# Patient Record
Sex: FEMALE | Race: BLACK OR AFRICAN AMERICAN | Employment: OTHER | URBAN - METROPOLITAN AREA
[De-identification: names, ages, dates, MRNs, and addresses within clinical notes are randomized per-mention and may not be internally consistent; named-entity substitution may affect disease eponyms.]

---

## 2017-11-06 ENCOUNTER — HOSPITAL ENCOUNTER (OUTPATIENT)
Dept: MAMMOGRAPHY | Age: 67
Discharge: HOME | End: 2017-11-06
Attending: FAMILY MEDICINE
Payer: MEDICARE

## 2017-11-06 DIAGNOSIS — Z12.31 VISIT FOR SCREENING MAMMOGRAM: ICD-10-CM

## 2017-11-06 PROCEDURE — 77067 SCR MAMMO BI INCL CAD: CPT

## 2018-04-04 RX ORDER — ASPIRIN 81 MG/1
81 TABLET ORAL DAILY
COMMUNITY

## 2018-04-04 RX ORDER — ATORVASTATIN CALCIUM 20 MG/1
20 TABLET, FILM COATED ORAL
COMMUNITY

## 2018-04-04 RX ORDER — AMLODIPINE BESYLATE 5 MG/1
5 TABLET ORAL DAILY
COMMUNITY

## 2018-04-04 RX ORDER — LOSARTAN POTASSIUM 50 MG/1
50 TABLET ORAL DAILY
COMMUNITY
End: 2021-08-30

## 2018-04-04 NOTE — PERIOP NOTES
[facility]  Ambulatory Surgery Unit  Pre-operative Instructions    Surgery/Procedure Date  4/11            Tentative Arrival Time 0915      1. On the day of your surgery/procedure, please report to the Ambulatory Surgery Unit Registration Desk and sign in at your designated time. The Ambulatory Surgery Unit is located in [facility] on the [facility] side of the [facility] across from the [address] [place] [address]. Please have all of your health insurance cards and a photo ID. 2. You must have someone with you to drive you home, as you should not drive a car for 24 hours following anesthesia. Please make arrangements for a responsible adult friend or family member to stay with you for at least the first 24 hours after your surgery. 3. Do not have anything to eat or drink (including water, gum, mints, coffee, juice) after midnight 4/10. This may not apply to medications prescribed by your physician. (Please note below the special instructions with medications to take the morning of surgery, if applicable.)    4. We recommend you do not drink any alcoholic beverages for 24 hours before and after your surgery. 5. Contact your surgeons office for instructions on the following medications: non-steroidal anti-inflammatory drugs (i.e. Advil, Aleve), vitamins, and supplements. (Some surgeons will want you to stop these medications prior to surgery and others may allow you to take them)   **If you are currently taking Plavix, Coumadin, Aspirin and/or other blood-thinning agents, contact your surgeon for instructions. ** Your surgeon will partner with the physician prescribing these medications to determine if it is safe to stop or if you need to continue taking. Please do not stop taking these medications without instructions from your surgeon.     6. In an effort to help prevent surgical site infection, we ask that you shower with an anti-bacterial soap (i.e. Dial or Safeguard) for 3 days prior to and on the morning of surgery, using a fresh towel after each shower. (Please begin this process with fresh bed linens.) Do not apply any lotions, powders, or deodorants after the shower on the day of your procedure. If applicable, please do not shave the operative site for 48 hours prior to surgery. 7. Wear comfortable clothes. Wear glasses instead of contacts. Do not bring any jewelry or money (other than copays or fees as instructed). Do not wear make-up, particularly mascara, the morning of your surgery. Do not wear nail polish, particularly if you are having foot /hand surgery. Wear your hair loose or down, no ponytails, buns, kya pins or clips. All body piercings must be removed. 8. You should understand that if you do not follow these instructions your surgery may be cancelled. If your physical condition changes (i.e. fever, cold or flu) please contact your surgeon as soon as possible. 9. It is important that you be on time. If a situation occurs where you may be late, or if you have any questions or problems, please call [phone].    10. Your surgery time may be subject to change. You will receive a phone call the day prior to surgery to confirm your arrival time. Special Instructions: Take all medications and inhalers, as prescribed, on the morning of surgery with a sip of water EXCEPT: none      I understand a pre-operative phone call will be made to verify my surgery time. In the event that I am not available, I give permission for a message to be left on my answering service and/or with another person?       yes      Reviewed by phone with pt, verbalized understanding.   ___________________      ___________________      ________________  (Signature of Patient)          (Witness)                   (Date and Time)

## 2018-04-06 NOTE — H&P
Visit Type:  pre op  Primary Provider:  Larry Muir MD    CC:  Abnormal Pap. History of Present Illness:  76 YO patient presents for pre op visit for LEEP procedure scheduled for 2018    DORCAS pap  EMB benign endometrium, fragment of THOMAS 2-3  colposcopy bx benign, ecc THOMAS 2-3      Vital Signs   75Years Old Female  Height:  60.5 inches  Weight: 117.5 pounds  BMI:      22.65  BP:       124/80    Past Pregnancy History   : 1  Para:     1  Aborta:  0  Term: 1, Premature: 0, Living Children: 1, Vaginal Deliveries: 1, C-Sections: 0, Elect. Ab: 0, Ectopics: 0    Gynecologic History   Does patient have any problems with urine leakage? no  Does patient have any other bladder problems? no  History of abnormal pap: yes  Gardasil Injection History: Not Applicable  Pt currently sexually active: yes  Current Contraception: None. History of STD: no     Allergies    This patient has no known allergies.     Medications Removed from Medication List          Past Medical History:     Reviewed history from 2018 and no changes required:        Abnormal pap smear , No colposcopy, follow up paps normal        Hypertension        Hyperlipidemia     Past Surgical History:     Reviewed history from 2018 and no changes required:        Vaginal delivery x1        Toe surgeries        Breast Reduction     Family History Summary:      Reviewed history Last on 2018 and no changes required:2018  Mother Sudha Labor.) - Has Family History of Diabetes - Entered On: 3/5/2018  Mother Sudha Labor.) - Has Family History of Hypertension - Entered On: 3/5/2018  Mother Madras Labor.) - Has Family History of Heart Disease - Entered On: 3/5/2018    General Comments - FH:  Family history transferred to MU2 compliant        Social History:     Reviewed history from 2018 and no changes required:        Single-        Retired-[employer]        Past Pregnancy History      :  1     Term Births:  1     Premature Births: 0     Living Children: 1     Para:   1     Prev : 0     Aborta:  0     Elect. Ab:  0     Spont. Ab:  0     Ectopics:  0      Review of Systems        See HPI    Except as noted in the HPI, the review of systems is negative for General, Breast, , Resp, GI, Endo, MS, Psych and Heme.       General Medical Physical Exam:     General Appearance:       well developed, well nourished, in no acute distress    Genitourinary:        Ext. 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) (ISE06-N30.1)  Discussed recommendation to proceed with LEEP procedure  Discussed risks including bleeding, infection, damage to surrounding structures, cervical stenosis   Questions were answered and consent signed  Post op expectations reviewed  Declines percocet rx, plan ibuprofen    Orders:  Pre-Op Exam (CPT-PO)      Medications (at conclusion of this visit)    2018 ADULT ASPIRIN EC LOW STRENGTH 81 MG ORAL TABLET DELAYED RELEASE (ASPIRIN)   2018 ATORVASTATIN CALCIUM 20 MG ORAL TABLET (ATORVASTATIN CALCIUM)   2018 AMLODIPINE BESYLATE 5 MG ORAL TABLET (AMLODIPINE BESYLATE) Prescribed by non 606/706 Analy Arevalo MD  2018 LOSARTAN POTASSIUM 50 MG ORAL TABLET (LOSARTAN POTASSIUM) Prescribed by non 606/706 Analy Arevalo MD          Electronically signed by Jefferson Bruner MD on 2018 at 1:01 PM    ________________________________________________________________________

## 2018-04-10 ENCOUNTER — ANESTHESIA EVENT (OUTPATIENT)
Dept: SURGERY | Age: 68
End: 2018-04-10
Payer: MEDICARE

## 2018-04-11 ENCOUNTER — ANESTHESIA (OUTPATIENT)
Dept: SURGERY | Age: 68
End: 2018-04-11
Payer: MEDICARE

## 2018-04-11 ENCOUNTER — HOSPITAL ENCOUNTER (OUTPATIENT)
Age: 68
Setting detail: OUTPATIENT SURGERY
Discharge: HOME | End: 2018-04-11
Attending: OBSTETRICS & GYNECOLOGY | Admitting: OBSTETRICS & GYNECOLOGY
Payer: MEDICARE

## 2018-04-11 VITALS
TEMPERATURE: 97.6 F | OXYGEN SATURATION: 100 % | DIASTOLIC BLOOD PRESSURE: 72 MMHG | HEIGHT: 60 IN | WEIGHT: 114.5 LBS | RESPIRATION RATE: 20 BRPM | HEART RATE: 52 BPM | SYSTOLIC BLOOD PRESSURE: 106 MMHG | BODY MASS INDEX: 22.48 KG/M2

## 2018-04-11 PROCEDURE — 88307 TISSUE EXAM BY PATHOLOGIST: CPT | Performed by: OBSTETRICS & GYNECOLOGY

## 2018-04-11 PROCEDURE — 77030007304 HC ELECTRD LP RND MEGA -A: Performed by: OBSTETRICS & GYNECOLOGY

## 2018-04-11 PROCEDURE — 88305 TISSUE EXAM BY PATHOLOGIST: CPT | Performed by: OBSTETRICS & GYNECOLOGY

## 2018-04-11 PROCEDURE — 76210000050 HC AMBSU PH II REC 0.5 TO 1 HR: Performed by: OBSTETRICS & GYNECOLOGY

## 2018-04-11 PROCEDURE — 74011250636 HC RX REV CODE- 250/636

## 2018-04-11 PROCEDURE — 77030010432 HC ELECTRD BALL COVD -B: Performed by: OBSTETRICS & GYNECOLOGY

## 2018-04-11 PROCEDURE — 76030000000 HC AMB SURG OR TIME 0.5 TO 1: Performed by: OBSTETRICS & GYNECOLOGY

## 2018-04-11 PROCEDURE — 77030021352 HC CBL LD SYS DISP COVD -B: Performed by: OBSTETRICS & GYNECOLOGY

## 2018-04-11 PROCEDURE — 77030003666 HC NDL SPINAL BD -A: Performed by: OBSTETRICS & GYNECOLOGY

## 2018-04-11 PROCEDURE — 74011250636 HC RX REV CODE- 250/636: Performed by: ANESTHESIOLOGY

## 2018-04-11 PROCEDURE — 76060000061 HC AMB SURG ANES 0.5 TO 1 HR: Performed by: OBSTETRICS & GYNECOLOGY

## 2018-04-11 PROCEDURE — 77030020255 HC SOL INJ LR 1000ML BG: Performed by: OBSTETRICS & GYNECOLOGY

## 2018-04-11 PROCEDURE — 74011000250 HC RX REV CODE- 250

## 2018-04-11 PROCEDURE — 77030011640 HC PAD GRND REM COVD -A: Performed by: OBSTETRICS & GYNECOLOGY

## 2018-04-11 PROCEDURE — 74011000250 HC RX REV CODE- 250: Performed by: OBSTETRICS & GYNECOLOGY

## 2018-04-11 PROCEDURE — 76210000040 HC AMBSU PH I REC FIRST 0.5 HR: Performed by: OBSTETRICS & GYNECOLOGY

## 2018-04-11 RX ORDER — ONDANSETRON 2 MG/ML
4 INJECTION INTRAMUSCULAR; INTRAVENOUS AS NEEDED
Status: DISCONTINUED | OUTPATIENT
Start: 2018-04-11 | End: 2018-04-11 | Stop reason: HOSPADM

## 2018-04-11 RX ORDER — FENTANYL CITRATE 50 UG/ML
INJECTION, SOLUTION INTRAMUSCULAR; INTRAVENOUS AS NEEDED
Status: DISCONTINUED | OUTPATIENT
Start: 2018-04-11 | End: 2018-04-11 | Stop reason: HOSPADM

## 2018-04-11 RX ORDER — SODIUM CHLORIDE 0.9 % (FLUSH) 0.9 %
5-10 SYRINGE (ML) INJECTION AS NEEDED
Status: DISCONTINUED | OUTPATIENT
Start: 2018-04-11 | End: 2018-04-11 | Stop reason: HOSPADM

## 2018-04-11 RX ORDER — MIDAZOLAM HYDROCHLORIDE 1 MG/ML
INJECTION, SOLUTION INTRAMUSCULAR; INTRAVENOUS AS NEEDED
Status: DISCONTINUED | OUTPATIENT
Start: 2018-04-11 | End: 2018-04-11 | Stop reason: HOSPADM

## 2018-04-11 RX ORDER — OXYCODONE AND ACETAMINOPHEN 5; 325 MG/1; MG/1
1 TABLET ORAL
Status: DISCONTINUED | OUTPATIENT
Start: 2018-04-11 | End: 2018-04-11 | Stop reason: HOSPADM

## 2018-04-11 RX ORDER — FERRIC SUBSULFATE 20-22G/100
SOLUTION, NON-ORAL MISCELLANEOUS AS NEEDED
Status: DISCONTINUED | OUTPATIENT
Start: 2018-04-11 | End: 2018-04-11 | Stop reason: HOSPADM

## 2018-04-11 RX ORDER — SODIUM CHLORIDE, SODIUM LACTATE, POTASSIUM CHLORIDE, CALCIUM CHLORIDE 600; 310; 30; 20 MG/100ML; MG/100ML; MG/100ML; MG/100ML
25 INJECTION, SOLUTION INTRAVENOUS CONTINUOUS
Status: DISCONTINUED | OUTPATIENT
Start: 2018-04-11 | End: 2018-04-11 | Stop reason: HOSPADM

## 2018-04-11 RX ORDER — DIPHENHYDRAMINE HYDROCHLORIDE 50 MG/ML
12.5 INJECTION, SOLUTION INTRAMUSCULAR; INTRAVENOUS AS NEEDED
Status: DISCONTINUED | OUTPATIENT
Start: 2018-04-11 | End: 2018-04-11 | Stop reason: HOSPADM

## 2018-04-11 RX ORDER — LIDOCAINE HYDROCHLORIDE 20 MG/ML
INJECTION, SOLUTION EPIDURAL; INFILTRATION; INTRACAUDAL; PERINEURAL AS NEEDED
Status: DISCONTINUED | OUTPATIENT
Start: 2018-04-11 | End: 2018-04-11 | Stop reason: HOSPADM

## 2018-04-11 RX ORDER — MORPHINE SULFATE 10 MG/ML
2 INJECTION, SOLUTION INTRAMUSCULAR; INTRAVENOUS
Status: DISCONTINUED | OUTPATIENT
Start: 2018-04-11 | End: 2018-04-11 | Stop reason: HOSPADM

## 2018-04-11 RX ORDER — LIDOCAINE HYDROCHLORIDE AND EPINEPHRINE 10; 10 MG/ML; UG/ML
INJECTION, SOLUTION INFILTRATION; PERINEURAL AS NEEDED
Status: DISCONTINUED | OUTPATIENT
Start: 2018-04-11 | End: 2018-04-11 | Stop reason: HOSPADM

## 2018-04-11 RX ORDER — PROPOFOL 10 MG/ML
INJECTION, EMULSION INTRAVENOUS
Status: DISCONTINUED | OUTPATIENT
Start: 2018-04-11 | End: 2018-04-11 | Stop reason: HOSPADM

## 2018-04-11 RX ORDER — KETOROLAC TROMETHAMINE 30 MG/ML
INJECTION, SOLUTION INTRAMUSCULAR; INTRAVENOUS AS NEEDED
Status: DISCONTINUED | OUTPATIENT
Start: 2018-04-11 | End: 2018-04-11 | Stop reason: HOSPADM

## 2018-04-11 RX ORDER — LIDOCAINE HYDROCHLORIDE 10 MG/ML
0.1 INJECTION, SOLUTION EPIDURAL; INFILTRATION; INTRACAUDAL; PERINEURAL AS NEEDED
Status: DISCONTINUED | OUTPATIENT
Start: 2018-04-11 | End: 2018-04-11 | Stop reason: HOSPADM

## 2018-04-11 RX ORDER — ONDANSETRON 2 MG/ML
INJECTION INTRAMUSCULAR; INTRAVENOUS AS NEEDED
Status: DISCONTINUED | OUTPATIENT
Start: 2018-04-11 | End: 2018-04-11 | Stop reason: HOSPADM

## 2018-04-11 RX ORDER — SODIUM CHLORIDE 0.9 % (FLUSH) 0.9 %
5-10 SYRINGE (ML) INJECTION EVERY 8 HOURS
Status: DISCONTINUED | OUTPATIENT
Start: 2018-04-11 | End: 2018-04-11 | Stop reason: HOSPADM

## 2018-04-11 RX ORDER — HYDROMORPHONE HYDROCHLORIDE 1 MG/ML
.2-.5 INJECTION, SOLUTION INTRAMUSCULAR; INTRAVENOUS; SUBCUTANEOUS ONCE
Status: DISCONTINUED | OUTPATIENT
Start: 2018-04-11 | End: 2018-04-11 | Stop reason: HOSPADM

## 2018-04-11 RX ORDER — FENTANYL CITRATE 50 UG/ML
25 INJECTION, SOLUTION INTRAMUSCULAR; INTRAVENOUS
Status: DISCONTINUED | OUTPATIENT
Start: 2018-04-11 | End: 2018-04-11 | Stop reason: HOSPADM

## 2018-04-11 RX ADMIN — LIDOCAINE HYDROCHLORIDE 40 MG: 20 INJECTION, SOLUTION EPIDURAL; INFILTRATION; INTRACAUDAL; PERINEURAL at 10:37

## 2018-04-11 RX ADMIN — FENTANYL CITRATE 50 MCG: 50 INJECTION, SOLUTION INTRAMUSCULAR; INTRAVENOUS at 10:37

## 2018-04-11 RX ADMIN — KETOROLAC TROMETHAMINE 30 MG: 30 INJECTION, SOLUTION INTRAMUSCULAR; INTRAVENOUS at 11:15

## 2018-04-11 RX ADMIN — ONDANSETRON 4 MG: 2 INJECTION INTRAMUSCULAR; INTRAVENOUS at 10:59

## 2018-04-11 RX ADMIN — FENTANYL CITRATE 50 MCG: 50 INJECTION, SOLUTION INTRAMUSCULAR; INTRAVENOUS at 10:43

## 2018-04-11 RX ADMIN — PROPOFOL 140 MCG/KG/MIN: 10 INJECTION, EMULSION INTRAVENOUS at 10:37

## 2018-04-11 RX ADMIN — SODIUM CHLORIDE, SODIUM LACTATE, POTASSIUM CHLORIDE, AND CALCIUM CHLORIDE 25 ML/HR: 600; 310; 30; 20 INJECTION, SOLUTION INTRAVENOUS at 10:05

## 2018-04-11 RX ADMIN — MIDAZOLAM HYDROCHLORIDE 2 MG: 1 INJECTION, SOLUTION INTRAMUSCULAR; INTRAVENOUS at 10:30

## 2018-04-11 NOTE — ANESTHESIA PREPROCEDURE EVALUATION
Anesthetic History   No history of anesthetic complications            Review of Systems / Medical History  Patient summary reviewed, nursing notes reviewed and pertinent labs reviewed    Pulmonary  Within defined limits                 Neuro/Psych   Within defined limits           Cardiovascular    Hypertension: well controlled          Hyperlipidemia    Exercise tolerance: >4 METS     GI/Hepatic/Renal  Within defined limits              Endo/Other  Within defined limits           Other Findings            Physical Exam    Airway  Mallampati: III  TM Distance: 4 - 6 cm  Neck ROM: normal range of motion   Mouth opening: Normal     Cardiovascular    Rhythm: regular  Rate: normal      Pertinent negatives: No murmur   Dental    Dentition: Caps/crowns and Bridges     Pulmonary  Breath sounds clear to auscultation               Abdominal  GI exam deferred       Other Findings            Anesthetic Plan    ASA: 2  Anesthesia type: MAC and general - backup          Induction: Intravenous  Anesthetic plan and risks discussed with: Patient

## 2018-04-11 NOTE — PERIOP NOTES
1115 Pt arrived to PACU. Pt alert and oriented. Pt c/o mild cramping. CRNA giving toradol. No drainage on peripad. Abd soft. 1150 Discharge instructions reviewed with friend Gilda Hu. Pt states cramping is much better. Pt meets discharge criteria.

## 2018-04-11 NOTE — DISCHARGE INSTRUCTIONS
After Care Instructions For Leep Cone Biopsy      1. Typically following this procedure, there is minimal pain. However, you may experience some dull crampy lower abdominal discomfort which can be relieved by Tylenol or Motrin. If severe pain develops, notify the office at [phone].    2. It is normal to experience some spotting or even light bleeding. This discharge may occur for several days following the procedure. If bleeding exceeds soaking a pad every 2 hours or passing blood clots, you should contact the office. 3. Avoid placing anything in your vagina. Do not douche or use tampons. [place] may be uncomfortable and may create bleeding and/or infection. These restrictions will be lifted after your physician has seen you on your post-op visit. 4. You may take showers. Avoid using a tub bath, swimming pool or hot tub until after your check-up. 5. Please notify the office if you have a fever which is a temperature above 100.4. You should also notify the office if you develop severe abdominal pain, heavy vaginal bleeding or a foul smelling vaginal discharge. 6. It is difficult to predict when your next menstrual period will occur as your body has undergone a major stress. Your period should regulate itself within the first 6 weeks post-op. 7. Please do not do strenuous exercise for the first 2 weeks following the procedure. You can then resume all usual activity. Your follow-up appointment should be in 4-6 weeks. Please contact the office at    [phone] if an appointment has not already been set up. Your first Pap smear post-operatively will be in 3-6 months. Please have a thorough discussion with the doctor about how often your follow-up needs to be, depending upon the disease we have treated you for.    >>>You received Toradol during your surgery.  You may not take any form of NSAIDS (non steroidal anti inflammatory drugs) such as Advil, Ibuprofen, Aleve, Motrin until 5:30.    DO NOT DRIVE WHILE TAKING NARCOTIC PAIN MEDICATIONS. DO NOT TAKE SLEEPING MEDICATIONS OR ANTIANXIETY MEDICATIONS WHILE TAKING NARCOTIC PAIN MEDICATIONS,  ESPECIALLY THE NIGHT OF ANESTHESIA. CPAP PATIENTS BE SURE TO WEAR MACHINE WHENEVER NAPPING OR SLEEPING. DISCHARGE SUMMARY from Nurse    The following personal items collected during your admission are returned to you:   Dental Appliance: Dental Appliances: None  Vision: Visual Aid: Glasses  Hearing Aid:    Jewelry:    Clothing: Clothing:  (clothing under stretcher)  Other Valuables:    Valuables sent to safe:        PATIENT INSTRUCTIONS:    After General Anesthesia or Intravenous Sedation, for 24 hours or while taking prescription Narcotics:        Someone should be with you for the next 24 hours. For your own safety, a responsible adult must drive you home. · Limit your activities  · Recommended activity: Rest today, up with assistance today. Do not climb stairs or shower unattended for the next 24 hours. · Please start with a soft bland diet and advance as tolerated (no nausea) to regular diet. · If you have a sore throat you should try the following: fluids, warm salt water gargles, or throat lozenges. If it does not improve after several days please follow up with your primary physician. · Do not drive and operate hazardous machinery  · Do not make important personal or business decisions  · Do  not drink alcoholic beverages  · If you have not urinated within 8 hours after discharge, please contact your surgeon on call.     Report the following to your surgeon:  · Excessive pain, swelling, redness or odor of or around the surgical area  · Temperature over 100.5  · Nausea and vomiting lasting longer than 4 hours or if unable to take medications  · Any signs of decreased circulation or nerve impairment to extremity: change in color, persistent  numbness, tingling, coldness or increase pain      · You will receive a Post Operative Call from one of the Recovery Room Nurses on the day after your surgery to check on you. It is very important for us to know how you are recovering after your surgery. If you have an issue or need to speak with someone, please call your surgeon, do not wait for the post operative call. · You may receive an e-mail or letter in the mail from [facility] regarding your experience with us in the Ambulatory Surgery Unit. Your feedback is valuable to us and we appreciate your participation in the survey. · If the above instructions are not adequate, please contact Denis Chamorro RN, [facility] anesthesia Nurse Manager or our Anesthesiologist, at [phone]. If you are having problems after your surgery, call the physician at their office number. · We wish you a speedy recovery ? What to do at Home:      *  Please give a list of your current medications to your Primary Care Provider. *  Please update this list whenever your medications are discontinued, doses are      changed, or new medications (including over-the-counter products) are added. *  Please carry medication information at all times in case of emergency situations. These are general instructions for a healthy lifestyle:    No smoking/ No tobacco products/ Avoid exposure to second hand smoke    Surgeon General's Warning:  Quitting smoking now greatly reduces serious risk to your health. Obesity, smoking, and sedentary lifestyle greatly increases your risk for illness    A healthy diet, regular physical exercise & weight monitoring are important for maintaining a healthy lifestyle    You may be retaining fluid if you have a history of heart failure or if you experience any of the following symptoms:  Weight gain of 3 pounds or more overnight or 5 pounds in a week, increased swelling in our hands or feet or shortness of breath while lying flat in bed.   Please call your doctor as soon as you notice any of these symptoms; do not wait until your next office visit. Recognize signs and symptoms of STROKE:    B - Balance  E - Eyes    F-  Face looks uneven    A-  Arms unable to move or move even    S-  Speech slurred or non-existent    T-  Time-call 911 as soon as signs and symptoms begin-DO NOT go       Back to bed or wait to see if you get better-TIME IS BRAIN. If you have not received your influenza and/or pneumococcal vaccine, please follow up with your primary care physician. The discharge information has been reviewed with the patient and caregiver. The patient and caregiver verbalized understanding.

## 2018-04-11 NOTE — IP AVS SNAPSHOT
[address] 
[phone] Patient: Mercy Ramirez MRN: [id number] :1950 About your hospitalization You were admitted on:  2018 You last received care in the:  \[facility]\"" ASU PACU You were discharged on:  2018 Why you were hospitalized Your primary diagnosis was:  Not on File Follow-up Information Follow up With Details Comments Contact Info Oliverio Juárez MD   [facility] [address] [place] [address] 
[phone] Discharge Orders None A check vandana indicates which time of day the medication should be taken. My Medications CONTINUE taking these medications Instructions Each Dose to Equal  
 Morning Noon Evening Bedtime  
 amLODIPine 5 mg tablet Commonly known as:  Kraen Zepeda Your last dose was: Your next dose is: Take 5 mg by mouth daily. Indications: hypertension 5 mg  
    
   
   
   
  
 aspirin delayed-release 81 mg tablet Your last dose was: Your next dose is: Take 81 mg by mouth daily. 81 mg  
    
   
   
   
  
 atorvastatin 20 mg tablet Commonly known as:  LIPITOR Your last dose was: Your next dose is: Take 20 mg by mouth nightly. 20 mg  
    
   
   
   
  
 losartan 50 mg tablet Commonly known as:  COZAAR Your last dose was: Your next dose is: Take 50 mg by mouth daily. Indications: hypertension 50 mg Discharge Instructions After Care Instructions For Leep Cone Biopsy 1. Typically following this procedure, there is minimal pain. However, you may experience some dull crampy lower abdominal discomfort which can be relieved by Tylenol or Motrin. If severe pain develops, notify the office at [phone]. 2. It is normal to experience some spotting or even light bleeding. This discharge may occur for several days following the procedure. If bleeding exceeds soaking a pad every 2 hours or passing blood clots, you should contact the office. 3. Avoid placing anything in your vagina. Do not douche or use tampons. Millbra[place] may be uncomfortable and may create bleeding and/or infection. These restrictions will be lifted after your physician has seen you on your post-op visit. 4. You may take showers. Avoid using a tub bath, swimming pool or hot tub until after your check-up. 5. Please notify the office if you have a fever which is a temperature above 100.4. You should also notify the office if you develop severe abdominal pain, heavy vaginal bleeding or a foul smelling vaginal discharge. 6. It is difficult to predict when your next menstrual period will occur as your body has undergone a major stress. Your period should regulate itself within the first 6 weeks post-op. 7. Please do not do strenuous exercise for the first 2 weeks following the procedure. You can then resume all usual activity. Your follow-up appointment should be in 4-6 weeks. Please contact the office at   
[phone] if an appointment has not already been set up. Your first Pap smear post-operatively will be in 3-6 months. Please have a thorough discussion with the doctor about how often your follow-up needs to be, depending upon the disease we have treated you for. DO NOT DRIVE WHILE TAKING NARCOTIC PAIN MEDICATIONS. DO NOT TAKE SLEEPING MEDICATIONS OR ANTIANXIETY MEDICATIONS WHILE TAKING NARCOTIC PAIN MEDICATIONS,  ESPECIALLY THE NIGHT OF ANESTHESIA. CPAP PATIENTS BE SURE TO WEAR MACHINE WHENEVER NAPPING OR SLEEPING. DISCHARGE SUMMARY from Nurse The following personal items collected during your admission are returned to you:  
Dental Appliance: Dental Appliances: None Vision: Visual Aid: Glasses Hearing Aid:   
Jewelry:   
Clothing: Clothing:  (clothing under stretcher) Other Valuables:   
Valuables sent to safe:   
 
 
PATIENT INSTRUCTIONS: 
 
 
B - Balance E - Eyes F-  Face looks uneven A-  Arms unable to move or move even S-  Speech slurred or non-existent T-  Time-call 911 as soon as signs and symptoms begin-DO NOT go Back to bed or wait to see if you get better-TIME IS BRAIN.  
 
 
If you have not received your influenza and/or pneumococcal vaccine, please follow up with your primary care physician. The discharge information has been reviewed with the patient and caregiver. The patient and caregiver verbalized understanding. Introducing [facility]! [facility] introduces Stackify patient portal. Now you can access parts of your medical record, email your doctor's office, and request medication refills online. 1. In your internet browser, go to [email or web address] 2. Click on the First Time User? Click Here link in the Sign In box. You will see the New Member Sign Up page. 3. Enter your Stackify Access Code exactly as it appears below. You will not need to use this code after youve completed the sign-up process. If you do not sign up before the expiration date, you must request a new code. · Stackify Access Code: [email or web address] Expires: 6/20/2018  6:47 AM 
 
4. Enter the last four digits of your Social Security Number (xxxx) and Date of Birth (mm/dd/yyyy) as indicated and click Submit. You will be taken to the next sign-up page. 5. Create a Stackify ID. This will be your Stackify login ID and cannot be changed, so think of one that is secure and easy to remember. 6. Create a Stackify password. You can change your password at any time. 7. Enter your Password Reset Question and Answer. This can be used at a later time if you forget your password. 8. Enter your e-mail address. You will receive e-mail notification when new information is available in [address] 9. Click Sign Up. You can now view and download portions of your medical record. 10. Click the Download Summary menu link to download a portable copy of your medical information. If you have questions, please visit the Frequently Asked Questions section of the Stackify website. Remember, Stackify is NOT to be used for urgent needs. For medical emergencies, dial 911. Now available from your iPhone and Android! Introducing Kai Willard As a [facility] patient, I wanted to make you aware of our electronic visit tool called Kai Willard. [facility]ev Slorjmb 24/7 allows you to connect within minutes with a medical provider 24 hours a day, seven days a week via a mobile device or tablet or logging into a secure website from your computer. You can access Kai Willard from anywhere in the [place]. A virtual visit might be right for you when you have a simple condition and feel like you just dont want to get out of bed, or cant get away from work for an appointment, when your regular [facility] provider is not available (evenings, weekends or holidays), or when youre out of town and need minor care. Electronic visits cost only $49 and if the [facility]ev MashMe[facility] 24/7 provider determines a prescription is needed to treat your condition, one can be electronically transmitted to a nearby pharmacy*. Please take a moment to enroll today if you have not already done so. The enrollment process is free and takes just a few minutes. To enroll, please download the CloudAptitude 24/ActionFlow jose d to your tablet or phone, or visit [email or web address] to enroll on your computer. And, as an [address] [place] [address] patient with a [employer] account, the results of your visits will be scanned into your electronic medical record and your primary care provider will be able to view the scanned results. We urge you to continue to see your regular [facility] provider for your ongoing medical care. And while your primary care provider may not be the one available when you seek a Kai Willard virtual visit, the peace of mind you get from getting a real diagnosis real time can be priceless. For more information on Kai Willard, view our Frequently Asked Questions (FAQs) at [email or web address]. Sincerely, 
 
Dianelys Roberson MD 
Chief Medical Officer Jaye Barlow *:  certain medications cannot be prescribed via Kai Willard Providers Seen During Your Hospitalization Provider Specialty Primary office phone Roxie Roca MD Obstetrics & Gynecology [phone] Your Primary Care Physician (PCP) Primary Care Physician Office Phone Office Fax [address]  , [address] [place] [address] [phone] You are allergic to the following No active allergies Recent Documentation Height Weight BMI OB Status Smoking Status 1.524 m 51.9 kg 22.36 kg/m2 Postmenopausal Never Smoker Emergency Contacts Name Discharge Info Relation Home Work Mobile Sae Young DISCHARGE CAREGIVER [3] Other Relative [6] [phone] [place] CAREGIVER [3] Friend [5]   [phone] Patient Belongings The following personal items are in your possession at time of discharge: 
  Dental Appliances: None  Visual Aid: Glasses             Clothing:  (clothing under stretcher) Please provide this summary of care documentation to your next provider. Signatures-by signing, you are acknowledging that this After Visit Summary has been reviewed with you and you have received a copy. Patient Signature:  ____________________________________________________________ Date:  ____________________________________________________________  
  
Montserrat Corral Provider Signature:  ____________________________________________________________ Date:  ____________________________________________________________

## 2018-04-11 NOTE — PERIOP NOTES
Permission received to review discharge instructions and discuss private health information with friend Lavell Mccarthy.

## 2018-04-11 NOTE — OP NOTES
OPERATIVE NOTE     NAME:Martha Kim  :1950      DATE OF PROCEDURE:  2018     PREOPERATIVE DIAGNOSIS:  THOMAS 2-3     POSTOPERATIVE DIAGNOSIS:  same     PROCEDURE: Procedure(s):  LOOP ELECTRODE EXCISION PROCEDURE OF CERVIX LEEP      SURGEON:  Mariano Dennis MD     ASSISTANT: None     ANESTHESIA:monitored anesthesia care     EBL: Minimal     SPECIMENS: 1) ectocervical LEEP biopsy  2) endocervical LEEP biopsy  3) endocervical curettage     FINDINGS: Cervix normal appearing other than an area of brown discoloration from 11 oclock to 1 oclock. Cervix atrophic appearing, nearly flush to vagina. Non-staining area of transformation zone excised completely     DESCRIPTION OF PROCEDURE: Patient was placed on the operating table in the supine position and after induction of anesthesia she was placed in the dorsal lithotomy position and draped in the usual fashion for vaginal surgery. Cervix was exposed with an insulated Graves speculum. Lugols solution was placed on the cervix and a non-staining area across the transformation zone was noted. Approximately 10cc of 1% lidocaine with epinephrine was then injected into the cervix. The smoke evacuator was attached to the insulated speculum and turned on. A 15x12 LEEP loop was chosen and attached to the hand piece. Several trial passes were made to  the angle and depth of excision. The transformation zone was then excised in a single pass. A 10x10 loop was then attached and a \"top hat\" biopsy was then performed. An ECC was then performed. A roller ball was then placed in the hand piece and the LEEP bed was made hemostatic with cautery. Monsel's solution was then placed to assure hemostasis. Excellent hemostasis was noted at this time. All instruments were then removed from the vagina. Instrument counts were correct.  She tolerated the procedure well and was transferred to the PACU in stable condition.

## 2018-04-11 NOTE — PERIOP NOTES
Hira Yates  1950  [id number]    Situation:  Verbal report given from: Chintan Clark CRNA  Procedure: Procedure(s):  LOOP ELECTRODE EXCISION PROCEDURE OF CERVIX LEEP    Background:    Preoperative diagnosis: THOMAS 3    Postoperative diagnosis: THOMAS 3    :  Dr. Louise Carroll    Assistant(s): Circ-1: Rox Irizarry  Scrub Tech-1: Fidel Mesa    Specimens:   ID Type Source Tests Collected by Time Destination   1 : Ectocervix Preservative Cervical  Magy Oconnor MD 4/11/2018 1108 Pathology   2 : Endocervix Preservative Cervical  Magy Oconnor MD 4/11/2018 1109 Pathology   3 : Endocervical Currettings Preservative Cervical  Magy Oconnor MD 4/11/2018 1109 Pathology       Assessment:  Intra-procedure medications         Anesthesia gave intra-procedure sedation and medications, see anesthesia flow sheet     Intravenous fluids: LR@ KVO     Vital signs stable       Recommendation:    Permission to share finding with friend, gabriel : yes

## 2018-04-11 NOTE — INTERVAL H&P NOTE
H&P Update:  Glenroy Alicia was seen and examined. History and physical has been reviewed. The patient has been examined.  There have been no significant clinical changes since the completion of the originally dated History and Physical.    Signed By: Андрей Kyle MD     April 11, 2018 10:25 AM

## 2018-04-11 NOTE — ANESTHESIA POSTPROCEDURE EVALUATION
Post-Anesthesia Evaluation and Assessment    Patient: Ankush Victor MRN: [id number]  SSN: [id number]    YOB: 1950  Age: 75 y.o. Sex: female       Cardiovascular Function/Vital Signs  Visit Vitals    /72    Pulse (!) 52    Temp 36.4 °C (97.6 °F)    Resp 20    Ht 5' (1.524 m)    Wt 51.9 kg (114 lb 8 oz)    SpO2 100%    BMI 22.36 kg/m2       Patient is status post MAC, general - backup anesthesia for Procedure(s):  LOOP ELECTRODE EXCISION PROCEDURE OF CERVIX LEEP. Nausea/Vomiting: None    Postoperative hydration reviewed and adequate. Pain:  Pain Scale 1: Numeric (0 - 10) (04/11/18 1130)  Pain Intensity 1: 3 (04/11/18 1130)   Managed    Neurological Status:   Neuro (WDL): Within Defined Limits (04/11/18 1130)   At baseline    Mental Status and Level of Consciousness: Arousable    Pulmonary Status:   O2 Device: Room air (04/11/18 1120)   Adequate oxygenation and airway patent    Complications related to anesthesia: None    Post-anesthesia assessment completed.  No concerns    Signed By: Shahab Gaona MD     April 11, 2018

## 2018-11-07 ENCOUNTER — HOSPITAL ENCOUNTER (OUTPATIENT)
Dept: MAMMOGRAPHY | Age: 68
Discharge: HOME | End: 2018-11-07
Attending: FAMILY MEDICINE
Payer: MEDICARE

## 2018-11-07 DIAGNOSIS — Z12.31 VISIT FOR SCREENING MAMMOGRAM: ICD-10-CM

## 2018-11-07 PROCEDURE — 77067 SCR MAMMO BI INCL CAD: CPT

## 2019-07-11 ENCOUNTER — HOSPITAL ENCOUNTER (OUTPATIENT)
Dept: ULTRASOUND IMAGING | Age: 69
Discharge: HOME | End: 2019-07-11
Attending: FAMILY MEDICINE
Payer: MEDICARE

## 2019-07-11 DIAGNOSIS — N28.9 RENAL INSUFFICIENCY: ICD-10-CM

## 2019-07-11 PROCEDURE — 76700 US EXAM ABDOM COMPLETE: CPT

## 2020-09-01 ENCOUNTER — HOSPITAL ENCOUNTER (OUTPATIENT)
Dept: MAMMOGRAPHY | Age: 70
Discharge: HOME | End: 2020-09-01
Attending: FAMILY MEDICINE
Payer: MEDICARE

## 2020-09-01 DIAGNOSIS — Z12.31 SCREENING MAMMOGRAM FOR HIGH-RISK PATIENT: ICD-10-CM

## 2020-09-01 PROCEDURE — 77067 SCR MAMMO BI INCL CAD: CPT

## 2021-08-11 ENCOUNTER — TRANSCRIBE ORDER (OUTPATIENT)
Dept: SCHEDULING | Age: 71
End: 2021-08-11

## 2021-08-11 DIAGNOSIS — Z12.31 VISIT FOR SCREENING MAMMOGRAM: Primary | ICD-10-CM

## 2021-08-17 ENCOUNTER — OFFICE VISIT (OUTPATIENT)
Dept: CARDIOLOGY CLINIC | Age: 71
End: 2021-08-17
Payer: MEDICARE

## 2021-08-17 VITALS
HEART RATE: 53 BPM | WEIGHT: 121 LBS | RESPIRATION RATE: 16 BRPM | SYSTOLIC BLOOD PRESSURE: 130 MMHG | DIASTOLIC BLOOD PRESSURE: 90 MMHG | OXYGEN SATURATION: 99 % | HEIGHT: 60 IN | BODY MASS INDEX: 23.75 KG/M2

## 2021-08-17 DIAGNOSIS — R07.89 OTHER CHEST PAIN: ICD-10-CM

## 2021-08-17 DIAGNOSIS — Z82.49 FAMILY HISTORY OF CARDIAC DISORDER: ICD-10-CM

## 2021-08-17 DIAGNOSIS — E78.2 MIXED HYPERLIPIDEMIA: ICD-10-CM

## 2021-08-17 DIAGNOSIS — I20.0 UNSTABLE ANGINA (HCC): Primary | ICD-10-CM

## 2021-08-17 DIAGNOSIS — I10 BENIGN ESSENTIAL HTN: ICD-10-CM

## 2021-08-17 PROCEDURE — 3017F COLORECTAL CA SCREEN DOC REV: CPT | Performed by: INTERNAL MEDICINE

## 2021-08-17 PROCEDURE — G8400 PT W/DXA NO RESULTS DOC: HCPCS | Performed by: INTERNAL MEDICINE

## 2021-08-17 PROCEDURE — G9899 SCRN MAM PERF RSLTS DOC: HCPCS | Performed by: INTERNAL MEDICINE

## 2021-08-17 PROCEDURE — G8420 CALC BMI NORM PARAMETERS: HCPCS | Performed by: INTERNAL MEDICINE

## 2021-08-17 PROCEDURE — G8536 NO DOC ELDER MAL SCRN: HCPCS | Performed by: INTERNAL MEDICINE

## 2021-08-17 PROCEDURE — 1101F PT FALLS ASSESS-DOCD LE1/YR: CPT | Performed by: INTERNAL MEDICINE

## 2021-08-17 PROCEDURE — G8510 SCR DEP NEG, NO PLAN REQD: HCPCS | Performed by: INTERNAL MEDICINE

## 2021-08-17 PROCEDURE — 93000 ELECTROCARDIOGRAM COMPLETE: CPT | Performed by: INTERNAL MEDICINE

## 2021-08-17 PROCEDURE — G8427 DOCREV CUR MEDS BY ELIG CLIN: HCPCS | Performed by: INTERNAL MEDICINE

## 2021-08-17 PROCEDURE — 99204 OFFICE O/P NEW MOD 45 MIN: CPT | Performed by: INTERNAL MEDICINE

## 2021-08-17 PROCEDURE — 1090F PRES/ABSN URINE INCON ASSESS: CPT | Performed by: INTERNAL MEDICINE

## 2021-08-17 RX ORDER — TRIAMCINOLONE ACETONIDE 1 MG/G
CREAM TOPICAL
COMMUNITY
Start: 2021-07-15

## 2021-08-17 RX ORDER — ERGOCALCIFEROL 1.25 MG/1
CAPSULE ORAL
COMMUNITY

## 2021-08-17 NOTE — LETTER
Patient:  Moose Wolfe   YOB: 1950  Date of Visit: 8/17/2021      Dear Teodoro Wallace MD  [facility]  [address] [facility] [address]  Via Fax: [phone]: Thank you for referring Ms. Moose Wolfe to me for evaluation/treatment. Below are the relevant portions of my assessment and plan of care. Ms. Risa Hopkins is a 68 yo F with history of essential hypertension, mixed hyperlipidemia, breast reduction surgery several years ago, here for cardiac evaluation. She is here due to episodes of chest discomfort that have been occurring over the last few months, often times substernal or left sided lasting minutes to hours at a time, can happen with rest or exertion. Sometimes when she is cutting the grass she feels more easily short of breath, but this last occurred on Sunday when she was lying down. Often times, it can be associated with nausea and she feels thirsty. She initially started having symptoms back in March associated with getting the COVID vaccine, but in July her symptoms progressed. She is compensated on exam with clear lungs and no lower extremity edema. She denies any prior cardiac history. Her EKG is sinus bradycardia, heart rate of 53 bpm and nonspecific ST-T wave abnormality. Soc hx. No tobacco  Fam hx. Mom with CAD 79. Assessment and Plan:   1. Unstable angina. Chest pain and shortness of breath with typical and atypical features and multiple risk factors; will proceed with a treadmill stress echocardiogram for further evaluation. If it is unrevealing, would consider GI etiology, as there is a component of nausea with this. 2. Essential hypertension. Blood pressure is controlled. 3. Mixed hyperlipidemia. 4. Family history of early coronary artery disease. If you have questions, please do not hesitate to call me.     Sincerely,      Nirali Villarreal MD

## 2021-08-17 NOTE — PROGRESS NOTES
[place]: Messi Graves  [phone]    History of Present Illness:  Ms. Sunday Weiss is a 70 yo F with history of essential hypertension, mixed hyperlipidemia, breast reduction surgery several years ago, here for cardiac evaluation. She is here due to episodes of chest discomfort that have been occurring over the last few months, often times substernal or left sided lasting minutes to hours at a time, can happen with rest or exertion. Sometimes when she is cutting the grass she feels more easily short of breath, but this last occurred on Sunday when she was lying down. Often times, it can be associated with nausea and she feels thirsty. She initially started having symptoms back in March associated with getting the COVID vaccine, but in July her symptoms progressed. She is compensated on exam with clear lungs and no lower extremity edema. She denies any prior cardiac history. Her EKG is sinus bradycardia, heart rate of 53 bpm and nonspecific ST-T wave abnormality. Soc hx. No tobacco  Fam hx. Mom with CAD 79. Assessment and Plan:   1. Unstable angina. Chest pain and shortness of breath with typical and atypical features and multiple risk factors; will proceed with a treadmill stress echocardiogram for further evaluation. If it is unrevealing, would consider GI etiology, as there is a component of nausea with this. 2. Essential hypertension. Blood pressure is controlled. 3. Mixed hyperlipidemia. 4. Family history of early coronary artery disease. She  has a past medical history of High cholesterol and Hypertension. All other systems negative except as above. PE  Vitals:    08/17/21 1107   BP: (!) 130/90   Pulse: (!) 53   Resp: 16   SpO2: 99%   Weight: 121 lb (54.9 kg)   Height: 5' (1.524 m)    Body mass index is 23.63 kg/m².    General appearance - alert, well appearing, and in no distress  Mental status - affect appropriate to mood  Eyes - sclera anicteric, moist mucous membranes  Neck - supple, no JVD  Chest - clear to auscultation, no wheezes, rales or rhonchi  Heart - normal rate, regular rhythm, normal S1, S2, no murmurs, rubs, clicks or gallops  Abdomen - soft, nontender, nondistended, no masses or organomegaly  Neurological -  no focal deficit  Extremities - peripheral pulses normal, no pedal edema    Recent Labs:  No results found for: CHOL, CHOLX, CHLST, CHOLV, 738727, HDL, HDLP, LDL, LDLC, DLDLP, TGLX, TRIGL, TRIGP, CHHD, CHHDX  No results found for: REAL, CREAPOC, [facility], CREA, REFC3, REFC4  No results found for: BUN, BUNPOC, IBUN, MBUNV, BUNV  No results found for: K, KI, PLK, WBK  No results found for: HBA1C, DVS2XQUT, DFD3KUOR  No results found for: HGBPOC, HGB, HGBP, HGBEXT, HGBEXT  No results found for: PLT, PLTEXT, PLTEXT    Reviewed:  Past Medical History:   Diagnosis Date    High cholesterol     Hypertension      Social History     Tobacco Use   Smoking Status Never Smoker   Smokeless Tobacco Never Used     Social History     Substance and Sexual Activity   Alcohol Use No     No Known Allergies    Current Outpatient Medications   Medication Sig    ergocalciferol (ERGOCALCIFEROL) 1,250 mcg (50,000 unit) capsule ergocalciferol (vitamin D2) 1,250 mcg (50,000 unit) capsule   Take 1 capsule every week by oral route as directed for 12 days.  triamcinolone acetonide (KENALOG) 0.1 % topical cream     amLODIPine (NORVASC) 5 mg tablet Take 5 mg by mouth daily. Indications: hypertension    losartan (COZAAR) 50 mg tablet Take 50 mg by mouth daily. Indications: hypertension    atorvastatin (LIPITOR) 20 mg tablet Take 20 mg by mouth nightly.  aspirin delayed-release 81 mg tablet Take 81 mg by mouth daily. No current facility-administered medications for this visit.        Myra Brandon MD  [facility] [place]  [address]  [address]

## 2021-08-30 ENCOUNTER — HOSPITAL ENCOUNTER (OUTPATIENT)
Dept: NON INVASIVE DIAGNOSTICS | Age: 71
Discharge: HOME | End: 2021-08-30
Attending: INTERNAL MEDICINE
Payer: MEDICARE

## 2021-08-30 VITALS
DIASTOLIC BLOOD PRESSURE: 85 MMHG | HEIGHT: 60 IN | SYSTOLIC BLOOD PRESSURE: 160 MMHG | BODY MASS INDEX: 23.75 KG/M2 | WEIGHT: 121 LBS

## 2021-08-30 DIAGNOSIS — I20.0 UNSTABLE ANGINA (HCC): ICD-10-CM

## 2021-08-30 LAB
STRESS ANGINA INDEX: 0
STRESS BASELINE DIAS BP: 85 MMHG
STRESS BASELINE HR: 61 BPM
STRESS BASELINE SYS BP: 160 MMHG
STRESS ESTIMATED WORKLOAD: 7 METS
STRESS EXERCISE DUR MIN: NORMAL
STRESS PEAK DIAS BP: 85 MMHG
STRESS PEAK SYS BP: 160 MMHG
STRESS PERCENT HR ACHIEVED: 87 %
STRESS POST PEAK HR: 130 BPM
STRESS RATE PRESSURE PRODUCT: NORMAL BPM*MMHG
STRESS STAGE 1 BP: NORMAL MMHG
STRESS STAGE 1 DURATION: 3 MIN:SEC
STRESS STAGE 1 HR: 109 BPM
STRESS STAGE 2 BP: NORMAL MMHG
STRESS STAGE 2 DURATION: 3 MIN:SEC
STRESS STAGE 2 HR: 129 BPM
STRESS STAGE RECOVERY 2 DURATION: 1 MIN:SEC
STRESS STAGE RECOVERY 2 HR: 87 BPM
STRESS STAGE RECOVERY 3 BP: NORMAL MMHG
STRESS STAGE RECOVERY 3 DURATION: 1 MIN:SEC
STRESS STAGE RECOVERY 3 HR: 71 BPM
STRESS STAGE RECOVERY 4 DURATION: 1 MIN:SEC
STRESS STAGE RECOVERY 4 HR: 71 BPM
STRESS TARGET HR: 149 BPM

## 2021-08-30 PROCEDURE — 93351 STRESS TTE COMPLETE: CPT

## 2021-09-10 ENCOUNTER — HOSPITAL ENCOUNTER (OUTPATIENT)
Dept: MAMMOGRAPHY | Age: 71
Discharge: HOME | End: 2021-09-10
Attending: FAMILY MEDICINE
Payer: MEDICARE

## 2021-09-10 DIAGNOSIS — Z12.31 VISIT FOR SCREENING MAMMOGRAM: ICD-10-CM

## 2021-09-10 PROCEDURE — 77067 SCR MAMMO BI INCL CAD: CPT

## 2021-10-22 ENCOUNTER — TELEPHONE (OUTPATIENT)
Dept: CARDIOLOGY CLINIC | Age: 71
End: 2021-10-22

## 2021-10-22 NOTE — TELEPHONE ENCOUNTER
Please see previous note and patient also would like a call back on Monday if possible. She will not available today.

## 2021-10-22 NOTE — TELEPHONE ENCOUNTER
Patient would like a call back about some questions she have from her office visit on the DOS 8/17/21.       Phone [phone]

## 2022-08-23 ENCOUNTER — TRANSCRIBE ORDER (OUTPATIENT)
Dept: SCHEDULING | Age: 72
End: 2022-08-23

## 2022-08-23 DIAGNOSIS — Z12.31 SCREENING MAMMOGRAM FOR HIGH-RISK PATIENT: Primary | ICD-10-CM

## 2022-09-23 ENCOUNTER — HOSPITAL ENCOUNTER (OUTPATIENT)
Dept: MAMMOGRAPHY | Age: 72
Discharge: HOME | End: 2022-09-23
Attending: FAMILY MEDICINE
Payer: MEDICARE

## 2022-09-23 DIAGNOSIS — Z12.31 SCREENING MAMMOGRAM FOR HIGH-RISK PATIENT: ICD-10-CM

## 2022-09-23 PROCEDURE — 77067 SCR MAMMO BI INCL CAD: CPT

## 2023-09-04 ENCOUNTER — HOSPITAL ENCOUNTER (EMERGENCY)
Facility: HOSPITAL | Age: 73
Discharge: HOME | End: 2023-09-04
Payer: MEDICARE

## 2023-09-04 ENCOUNTER — APPOINTMENT (OUTPATIENT)
Facility: HOSPITAL | Age: 73
End: 2023-09-04
Payer: MEDICARE

## 2023-09-04 VITALS
DIASTOLIC BLOOD PRESSURE: 78 MMHG | TEMPERATURE: 98.9 F | HEIGHT: 60 IN | SYSTOLIC BLOOD PRESSURE: 123 MMHG | WEIGHT: 119 LBS | BODY MASS INDEX: 23.36 KG/M2 | HEART RATE: 60 BPM | OXYGEN SATURATION: 98 % | RESPIRATION RATE: 16 BRPM
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
## 2023-09-04 DIAGNOSIS — S80.862A INSECT BITE OF LEFT LOWER EXTREMITY, INITIAL ENCOUNTER: Primary | ICD-10-CM

## 2023-09-04 DIAGNOSIS — R60.0 LEG EDEMA, LEFT: ICD-10-CM

## 2023-09-04 LAB — ECHO BSA: 1.51 M2

## 2023-09-04 PROCEDURE — 99284 EMERGENCY DEPT VISIT MOD MDM: CPT

## 2023-09-04 PROCEDURE — 93971 EXTREMITY STUDY: CPT

## 2023-09-04 ASSESSMENT — PAIN DESCRIPTION - ORIENTATION: ORIENTATION: LOWER

## 2023-09-04 ASSESSMENT — ENCOUNTER SYMPTOMS
SHORTNESS OF BREATH: 0
CHEST TIGHTNESS: 0
VOMITING: 0
BACK PAIN: 0
COUGH: 0
ABDOMINAL PAIN: 0
NAUSEA: 0

## 2023-09-04 ASSESSMENT — PAIN SCALES - GENERAL: PAINLEVEL_OUTOF10: 8

## 2023-09-04 ASSESSMENT — PAIN DESCRIPTION - DESCRIPTORS: DESCRIPTORS: SORE

## 2023-09-04 ASSESSMENT — PAIN DESCRIPTION - LOCATION: LOCATION: LEG

## 2023-09-04 NOTE — ED NOTES
DC papers reviewed and in hand, pt verbalized understanding. Patient ambulatory out of ED with steady gait, no acute distress noted.           Besys Lord RN  09/04/23 1500

## 2023-09-04 NOTE — ED PROVIDER NOTES
MEDICATIONS:  Current Discharge Medication List          I have seen and evaluated the patient autonomously. My supervision physician was on site and available for consultation if needed. I am the Primary Clinician of Record. ARGENTINA Mullen CNP (electronically signed)    (Please note that parts of this dictation were completed with voice recognition software. Quite often unanticipated grammatical, syntax, homophones, and other interpretive errors are inadvertently transcribed by the computer software. Please disregards these errors.  Please excuse any errors that have escaped final proofreading.)        ARGENTINA Mullen CNP  09/04/23 1640

## 2023-09-25 ENCOUNTER — HOSPITAL ENCOUNTER (OUTPATIENT)
Facility: HOSPITAL | Age: 73
Discharge: HOME | End: 2023-09-28
Payer: MEDICARE

## 2023-09-25 DIAGNOSIS — Z12.31 SCREENING MAMMOGRAM FOR HIGH-RISK PATIENT: ICD-10-CM

## 2023-09-25 PROCEDURE — 77067 SCR MAMMO BI INCL CAD: CPT

## 2024-09-05 ENCOUNTER — TRANSCRIBE ORDERS (OUTPATIENT)
Facility: HOSPITAL | Age: 74
End: 2024-09-05

## 2024-09-05 DIAGNOSIS — Z12.31 ENCOUNTER FOR SCREENING MAMMOGRAM FOR MALIGNANT NEOPLASM OF BREAST: Primary | ICD-10-CM

## 2024-10-04 ENCOUNTER — HOSPITAL ENCOUNTER (OUTPATIENT)
Facility: HOSPITAL | Age: 74
Discharge: HOME | End: 2024-10-07
Payer: MEDICARE

## 2024-10-04 VITALS — HEIGHT: 60 IN | BODY MASS INDEX: 23.36 KG/M2 | WEIGHT: 119 LBS

## 2024-10-04 DIAGNOSIS — Z12.31 ENCOUNTER FOR SCREENING MAMMOGRAM FOR MALIGNANT NEOPLASM OF BREAST: ICD-10-CM

## 2024-10-04 PROCEDURE — 77067 SCR MAMMO BI INCL CAD: CPT

## (undated) DEVICE — GOWN,SIRUS,FABRNF,XL,20/CS: Brand: MEDLINE

## (undated) DEVICE — CONTINU-FLO SOLUTION SET, 2 INJECTION SITES, MALE LUER LOCK ADAPTER WITH RETRACTABLE COLLAR, LARGE BORE STOPCOCK WITH ROTATING MALE LUER LOCK EXTENSION SET, 2 INJECTION SITES, MALE LUER LOCK ADAPTER WITH RETRACTABLE COLLAR: Brand: INTERLINK/CONTINU-FLO

## (undated) DEVICE — MEDI-VAC NON-CONDUCTIVE SUCTION TUBING: Brand: CARDINAL HEALTH

## (undated) DEVICE — D&C/GYN-LF: Brand: MEDLINE INDUSTRIES, INC.

## (undated) DEVICE — STERILE POLYISOPRENE POWDER-FREE SURGICAL GLOVES: Brand: PROTEXIS

## (undated) DEVICE — LLETZ LOOP ELECTRODE, 15MM WIDE X 12MM DEEP, 11.8 CM SHAFT, GREEN: Brand: MEGADYNE

## (undated) DEVICE — INFECTION CONTROL KIT SYS

## (undated) DEVICE — NEEDLE SPNL 22GA L3.5IN BLK HUB S STL REG WALL FIT STYL W/

## (undated) DEVICE — REM POLYHESIVE ADULT PATIENT RETURN ELECTRODE: Brand: VALLEYLAB

## (undated) DEVICE — KENDALL DL ECG CABLE AND LEAD WIRE SYSTEM, 3-LEAD, SINGLE PATIENT USE: Brand: KENDALL

## (undated) DEVICE — SYR 10ML LUER LOK 1/5ML GRAD --

## (undated) DEVICE — ROCKER SWITCH PENCIL BLADE ELECTRODE, HOLSTER: Brand: EDGE

## (undated) DEVICE — 3M™ TEGADERM™ TRANSPARENT FILM DRESSING FRAME STYLE, 1624W, 2-3/8 IN X 2-3/4 IN (6 CM X 7 CM), 100/CT 4CT/CASE: Brand: 3M™ TEGADERM™

## (undated) DEVICE — PREP PAD BNS: Brand: CONVERTORS

## (undated) DEVICE — LLETZ LOOP ELECTRODE, 10MM WIDE X 10MM DEEP, 11.8 CM SHAFT, YELLOW: Brand: MEGADYNE

## (undated) DEVICE — BALL ELECTRODE: Brand: VALLEYLAB

## (undated) DEVICE — SOLUTION LACTATED RINGERS INJECTION USP

## (undated) DEVICE — LIGHT HANDLE: Brand: DEVON